# Patient Record
Sex: MALE | Race: WHITE | NOT HISPANIC OR LATINO | Employment: OTHER | URBAN - METROPOLITAN AREA
[De-identification: names, ages, dates, MRNs, and addresses within clinical notes are randomized per-mention and may not be internally consistent; named-entity substitution may affect disease eponyms.]

---

## 2023-11-22 ENCOUNTER — OFFICE VISIT (OUTPATIENT)
Dept: URGENT CARE | Facility: MEDICAL CENTER | Age: 83
End: 2023-11-22
Payer: COMMERCIAL

## 2023-11-22 VITALS
SYSTOLIC BLOOD PRESSURE: 140 MMHG | HEART RATE: 64 BPM | DIASTOLIC BLOOD PRESSURE: 86 MMHG | TEMPERATURE: 98.8 F | OXYGEN SATURATION: 95 % | RESPIRATION RATE: 18 BRPM

## 2023-11-22 DIAGNOSIS — H10.33 ACUTE BACTERIAL CONJUNCTIVITIS OF BOTH EYES: Primary | ICD-10-CM

## 2023-11-22 PROCEDURE — 99203 OFFICE O/P NEW LOW 30 MIN: CPT | Performed by: ORTHOPAEDIC SURGERY

## 2023-11-22 PROCEDURE — S9083 URGENT CARE CENTER GLOBAL: HCPCS | Performed by: ORTHOPAEDIC SURGERY

## 2023-11-22 RX ORDER — POLYMYXIN B SULFATE AND TRIMETHOPRIM 1; 10000 MG/ML; [USP'U]/ML
1 SOLUTION OPHTHALMIC EVERY 4 HOURS
Qty: 10 ML | Refills: 0 | Status: SHIPPED | OUTPATIENT
Start: 2023-11-22 | End: 2023-11-29

## 2023-11-22 NOTE — PATIENT INSTRUCTIONS
Take Polytrim as prescribed  If your conjunctivitis is caused by a virus, it may take 2-3 weeks for resolution of symptoms  May take over-the-counter Claritin  Cold/warm compresses for symptom relief  Throw out old eye makeup and do not wear makeup during treatment  Wash hands frequently to prevent spread  Change pillow cases daily  May return to school/work within 24 hours of starting antibiotic  Follow up with PCP in 3-5 days

## 2023-11-22 NOTE — PROGRESS NOTES
[facility]        NAME: Lolis Avery is a 82 y.o. male  : 1940    MRN: [id number]  DATE: 2023  TIME: 11:18 AM    Assessment and Plan   Acute bacterial conjunctivitis of both eyes [H10.33]  1. Acute bacterial conjunctivitis of both eyes  polymyxin b-trimethoprim (POLYTRIM) ophthalmic solution            Patient Instructions     Take Polytrim as prescribed  If your conjunctivitis is caused by a virus, it may take 2-3 weeks for resolution of symptoms  May take over-the-counter Claritin  Cold/warm compresses for symptom relief  Throw out old eye makeup and do not wear makeup during treatment  Wash hands frequently to prevent spread  Change pillow cases daily  May return to school/work within 24 hours of starting antibiotic  Follow up with PCP in 3-5 days    Chief Complaint     Chief Complaint   Patient presents with    Conjunctivitis     Pt C/O B/L eye redness with discharge for about 4 days. History of Present Illness       82 YOM presents to the urgent care for evauation of bilateral eye redness, discharge, pain, itching. He notes symptoms started 4 days ago. He denies any fevers, chills, nausea, vomiting, diarrhea. He denies any congestion, ear ache, sore throat, headache. He does not wear contact lenses. He has not taken any medication for his symptoms. The patient denies any known sick contacts. Review of Systems   Review of Systems   Constitutional:  Negative for chills and fever. HENT:  Negative for congestion, ear pain, postnasal drip and sore throat. Eyes:  Positive for pain, discharge, redness and itching. Negative for photophobia and visual disturbance. Respiratory:  Negative for cough and shortness of breath. Cardiovascular:  Negative for chest pain and palpitations. Gastrointestinal:  Negative for abdominal pain, diarrhea, nausea and vomiting. Genitourinary:  Negative for dysuria, frequency and hematuria.    Musculoskeletal:  Negative for arthralgias and back pain. Skin:  Negative for color change and rash. Neurological:  Negative for dizziness, seizures, syncope and headaches. Psychiatric/Behavioral: Negative. All other systems reviewed and are negative. 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     No past medical history on file. No past surgical history on file. No family history on file. Medications have been verified. Objective   /86 (BP Location: Left arm, Patient Position: Sitting, Cuff Size: Large)   Pulse 64   Temp 98.8 °F (37.1 °C) (Tympanic)   Resp 18   SpO2 95%        Physical Exam     Physical Exam  Vitals and nursing note reviewed. Constitutional:       General: He is not in acute distress. Appearance: Normal appearance. He is not ill-appearing. HENT:      Head: Normocephalic and atraumatic. Right Ear: Tympanic membrane normal.      Left Ear: Tympanic membrane normal.      Nose: Nose normal.      Mouth/Throat:      Pharynx: Oropharynx is clear. Eyes:      Extraocular Movements: Extraocular movements intact. Pupils: Pupils are equal, round, and reactive to light. Comments: Bilateral eye injection.  No noted drainage at the time of exam. No foreign bodies. Cardiovascular:      Rate and Rhythm: Normal rate and regular rhythm. Pulses: Normal pulses. Heart sounds: Normal heart sounds. Pulmonary:      Effort: Pulmonary effort is normal. No respiratory distress. Breath sounds: Normal breath sounds. No wheezing or rhonchi. Musculoskeletal:         General: Normal range of motion. Cervical back: Normal range of motion. Skin:     General: Skin is warm and dry. Capillary Refill: Capillary refill takes less than 2 seconds. Neurological:      General: No focal deficit present. Mental Status: He is alert and oriented to person, place, and time.    Psychiatric:         Mood and Affect: Mood normal.         Behavior: Behavior normal.